# Patient Record
Sex: MALE | Race: WHITE | NOT HISPANIC OR LATINO | URBAN - METROPOLITAN AREA
[De-identification: names, ages, dates, MRNs, and addresses within clinical notes are randomized per-mention and may not be internally consistent; named-entity substitution may affect disease eponyms.]

---

## 2018-10-05 ENCOUNTER — SURGERY - HEALTHEAST (OUTPATIENT)
Dept: SURGERY | Facility: CLINIC | Age: 58
End: 2018-10-05

## 2018-10-05 ENCOUNTER — ANESTHESIA - HEALTHEAST (OUTPATIENT)
Dept: SURGERY | Facility: CLINIC | Age: 58
End: 2018-10-05

## 2018-10-11 ASSESSMENT — MIFFLIN-ST. JEOR: SCORE: 1789.68

## 2018-10-12 ENCOUNTER — SURGERY - HEALTHEAST (OUTPATIENT)
Dept: SURGERY | Facility: HOSPITAL | Age: 58
End: 2018-10-12

## 2018-10-12 ENCOUNTER — ANESTHESIA - HEALTHEAST (OUTPATIENT)
Dept: SURGERY | Facility: HOSPITAL | Age: 58
End: 2018-10-12

## 2018-10-14 ENCOUNTER — RECORDS - HEALTHEAST (OUTPATIENT)
Dept: ADMINISTRATIVE | Facility: OTHER | Age: 58
End: 2018-10-14

## 2018-10-18 ENCOUNTER — RECORDS - HEALTHEAST (OUTPATIENT)
Dept: ADMINISTRATIVE | Facility: OTHER | Age: 58
End: 2018-10-18

## 2018-10-19 ENCOUNTER — AMBULATORY - HEALTHEAST (OUTPATIENT)
Dept: SURGERY | Facility: CLINIC | Age: 58
End: 2018-10-19

## 2018-10-19 DIAGNOSIS — K64.4 EXTERNAL HEMORRHOIDS: ICD-10-CM

## 2018-12-20 ENCOUNTER — RECORDS - HEALTHEAST (OUTPATIENT)
Dept: ADMINISTRATIVE | Facility: OTHER | Age: 58
End: 2018-12-20

## 2018-12-27 ENCOUNTER — AMBULATORY - HEALTHEAST (OUTPATIENT)
Dept: ADMINISTRATIVE | Facility: CLINIC | Age: 58
End: 2018-12-27

## 2018-12-27 DIAGNOSIS — K64.9 HEMORRHOIDS: ICD-10-CM

## 2021-06-01 VITALS — WEIGHT: 210 LBS | BODY MASS INDEX: 29.4 KG/M2 | HEIGHT: 71 IN

## 2021-06-01 VITALS — WEIGHT: 208 LBS | HEIGHT: 71 IN | BODY MASS INDEX: 29.12 KG/M2

## 2021-06-20 NOTE — ANESTHESIA POSTPROCEDURE EVALUATION
Patient: Danilo Pappas  CYSTOSCOPY, LEFT URETEROSCOPY, LEFT RETROGRADE PYELOGRAM, LEFT STENT INSERTION  Anesthesia type: general    Patient location: PACU  Last vitals:   Vitals:    10/05/18 1900   BP: (!) 174/93   Pulse: (!) 52   Resp: 21   Temp:    SpO2: 98%     Post vital signs: stable  Level of consciousness: awake and responds to simple questions  Post-anesthesia pain: pain controlled  Post-anesthesia nausea and vomiting: no  Pulmonary: unassisted, return to baseline  Cardiovascular: stable and blood pressure at baseline  Hydration: adequate  Anesthetic events: no    QCDR Measures:  ASA# 11 - Lyn[place]ay-op Cardiac Arrest: ASA11B - Patient did NOT experience unanticipated cardiac arrest  ASA# 12 - Lyn[place]ay-op Mortality Rate: ASA12B - Patient did NOT die  ASA# 13 - PACU Re-Intubation Rate: ASA13B - Patient did NOT require a new airway mgmt  ASA# 10 - Composite Anes Safety: ASA10A - No serious adverse event    Additional Notes:   appropriate for situation

## 2021-06-20 NOTE — ANESTHESIA CARE TRANSFER NOTE
Last vitals:   Vitals:    10/05/18 1845   BP: 123/79   Pulse: (!) 57   Resp: 14   Temp: 37  C (98.6  F)   SpO2: 98%     Patient's level of consciousness is drowsy  Spontaneous respirations: yes  Maintains airway independently: yes  Dentition unchanged: yes  Oropharynx: oropharynx clear of all foreign objects    QCDR Measures:  ASA# 20 - Surgical Safety Checklist: WHO surgical safety checklist completed prior to induction  PQRS# 430 - Adult PONV Prevention: 4558F - Pt received => 2 anti-emetic agents (different classes) preop & intraop  ASA# 8 - Peds PONV Prevention: NA - Not pediatric patient, not GA or 2 or more risk factors NOT present  PQRS# 424 - Lyndsay-op Temp Management: 4559F - At least one body temp DOCUMENTED => 35.5C or 95.9F within required timeframe  PQRS# 426 - PACU Transfer Protocol: - Transfer of care checklist used  ASA# 14 - Acute Post-op Pain: ASA14B - Patient did NOT experience pain >= 7 out of 10

## 2021-06-21 NOTE — ANESTHESIA POSTPROCEDURE EVALUATION
Patient: Danilo Pappas  CYSTOSCOPY LEFT URETEROSCOPY LASER LITHOTRIPSY , LEFT RETROGRADE PYELOGRAM LEFT STENT EXCHANGE  Anesthesia type: general    Patient location: PACU  Last vitals:   Vitals:    10/12/18 1950   BP: (!) 209/107   Pulse: (!) 55   Resp: 12   Temp:    SpO2: 97%     Post vital signs: stable  Level of consciousness: awake and responds to simple questions  Post-anesthesia pain: pain controlled  Post-anesthesia nausea and vomiting: no  Pulmonary: unassisted, return to baseline  Cardiovascular: stable and blood pressure at baseline  Hydration: adequate  Anesthetic events: no    QCDR Measures:  ASA# 11 - Lyndsay-op Cardiac Arrest: ASA11B - Patient did NOT experience unanticipated cardiac arrest  ASA# 12 - Lyn[place]ay-op Mortality Rate: ASA12B - Patient did NOT die  ASA# 13 - PACU Re-Intubation Rate: ASA13B - Patient did NOT require a new airway mgmt  ASA# 10 - Composite Anes Safety: ASA10A - No serious adverse event    Additional Notes:

## 2021-06-21 NOTE — ANESTHESIA CARE TRANSFER NOTE
Last vitals:   Vitals:    10/12/18 1849   BP: 136/76   Pulse: (!) 49   Resp: 12   Temp: 36.6  C (97.9  F)   SpO2: 100%     Patient's level of consciousness is drowsy  Spontaneous respirations: yes  Maintains airway independently: yes  Dentition unchanged: yes  Oropharynx: oropharynx clear of all foreign objects    QCDR Measures:  ASA# 20 - Surgical Safety Checklist: WHO surgical safety checklist completed prior to induction  PQRS# 430 - Adult PONV Prevention: 4558F - Pt received => 2 anti-emetic agents (different classes) preop & intraop  ASA# 8 - Peds PONV Prevention: NA - Not pediatric patient, not GA or 2 or more risk factors NOT present  PQRS# 424 - Lyndsay-op Temp Management: 4559F - At least one body temp DOCUMENTED => 35.5C or 95.9F within required timeframe  PQRS# 426 - PACU Transfer Protocol: - Transfer of care checklist used  ASA# 14 - Acute Post-op Pain: ASA14B - Patient did NOT experience pain >= 7 out of 10

## 2021-07-03 NOTE — ANESTHESIA PREPROCEDURE EVALUATION
Anesthesia Preprocedure Evaluation by Prasad Laguna MD at 10/12/2018 12:29 PM     Author: Prasad Laguna MD Service: -- Author Type: Physician    Filed: 10/12/2018  4:26 PM Date of Service: 10/12/2018 12:29 PM Status: Addendum    : Prasad Laguna MD (Physician)    Related Notes: Original Note by Margie Ruth MD (Physician) filed at 10/12/2018  3:30 PM       Anesthesia Evaluation      Patient summary reviewed   No history of anesthetic complications (No issues with GA for cysto/lithotripsy on 10/5)     Airway   Mallampati: II  Neck ROM: full   Pulmonary - normal exam                          Cardiovascular - normal exam  Exercise tolerance: > or = 4 METS  (+) hypertension, ,     ECG reviewed        Neuro/Psych - negative ROS     Endo/Other - negative ROS      GI/Hepatic/Renal    (+)   chronic renal disease,           Dental                               Anesthesia Plan  Planned anesthetic: general LMA  LMA 5  50 mg ketamine IV on induction.  Decadron 10, Zofran 4 for antiemesis   ASA 2   Induction: intravenous   Anesthetic plan and risks discussed with: patient and spouse  Anesthesia plan special considerations: antiemetics,   Post-op plan: routine recovery

## 2021-07-03 NOTE — ANESTHESIA PREPROCEDURE EVALUATION
Anesthesia Preprocedure Evaluation by Gonsalo Aguilar MD at 10/5/2018  4:21 PM     Author: Gonsalo Aguilar MD Service: -- Author Type: Physician    Filed: 10/5/2018  4:21 PM Date of Service: 10/5/2018  4:21 PM Status: Signed    : Gonsalo Aguilar MD (Physician)       Anesthesia Evaluation      Patient summary reviewed   No history of anesthetic complications     Airway   Mallampati: II  Neck ROM: full   Pulmonary - normal exam                          Cardiovascular - normal exam  Exercise tolerance: > or = 4 METS  (+) hypertension, ,     ECG reviewed        Neuro/Psych - negative ROS     Endo/Other - negative ROS      GI/Hepatic/Renal    (+)   chronic renal disease,           Dental                             Anesthesia Plan  Planned anesthetic: general LMA  50 mg ketamine IV on induction.    ASA 2   Induction: intravenous   Anesthetic plan and risks discussed with: patient and spouse  Anesthesia plan special considerations: antiemetics,   Post-op plan: routine recovery

## 2025-07-25 ENCOUNTER — LAB REQUISITION (OUTPATIENT)
Dept: LAB | Facility: CLINIC | Age: 65
End: 2025-07-25
Payer: COMMERCIAL

## 2025-07-25 DIAGNOSIS — N20.0 CALCULUS OF KIDNEY: ICD-10-CM

## 2025-07-25 PROCEDURE — 82365 CALCULUS SPECTROSCOPY: CPT | Mod: ORL | Performed by: UROLOGY

## 2025-07-28 LAB
APPEARANCE STONE: NORMAL
COMPN STONE: NORMAL
SPECIMEN WT: NORMAL MG